# Patient Record
Sex: FEMALE | Race: BLACK OR AFRICAN AMERICAN | Employment: OTHER | URBAN - METROPOLITAN AREA
[De-identification: names, ages, dates, MRNs, and addresses within clinical notes are randomized per-mention and may not be internally consistent; named-entity substitution may affect disease eponyms.]

---

## 2017-01-01 ENCOUNTER — OFFICE VISIT (OUTPATIENT)
Dept: SURGERY | Age: 77
End: 2017-01-01

## 2017-01-01 ENCOUNTER — OFFICE VISIT (OUTPATIENT)
Dept: ORTHOPEDIC SURGERY | Age: 77
End: 2017-01-01

## 2017-01-01 ENCOUNTER — HOSPITAL ENCOUNTER (OUTPATIENT)
Age: 77
Discharge: HOME | End: 2017-06-03
Attending: PSYCHIATRY & NEUROLOGY
Payer: MEDICARE

## 2017-01-01 ENCOUNTER — CLINICAL SUPPORT (OUTPATIENT)
Dept: CARDIOLOGY CLINIC | Age: 77
End: 2017-01-01

## 2017-01-01 ENCOUNTER — HOSPITAL ENCOUNTER (OUTPATIENT)
Dept: MAMMOGRAPHY | Age: 77
Discharge: HOME | End: 2017-03-09
Attending: INTERNAL MEDICINE
Payer: MEDICARE

## 2017-01-01 ENCOUNTER — OFFICE VISIT (OUTPATIENT)
Dept: PULMONOLOGY | Age: 77
End: 2017-01-01

## 2017-01-01 ENCOUNTER — TELEPHONE (OUTPATIENT)
Dept: CARDIOLOGY CLINIC | Age: 77
End: 2017-01-01

## 2017-01-01 ENCOUNTER — OFFICE VISIT (OUTPATIENT)
Dept: CARDIOLOGY CLINIC | Age: 77
End: 2017-01-01

## 2017-01-01 VITALS
DIASTOLIC BLOOD PRESSURE: 82 MMHG | WEIGHT: 133 LBS | SYSTOLIC BLOOD PRESSURE: 128 MMHG | TEMPERATURE: 98.6 F | RESPIRATION RATE: 16 BRPM | BODY MASS INDEX: 22.71 KG/M2 | OXYGEN SATURATION: 96 % | HEIGHT: 64 IN | HEART RATE: 67 BPM

## 2017-01-01 VITALS
SYSTOLIC BLOOD PRESSURE: 132 MMHG | DIASTOLIC BLOOD PRESSURE: 78 MMHG | HEIGHT: 64 IN | OXYGEN SATURATION: 98 % | HEART RATE: 88 BPM | BODY MASS INDEX: 23.05 KG/M2 | WEIGHT: 135 LBS | RESPIRATION RATE: 14 BRPM | TEMPERATURE: 98 F

## 2017-01-01 VITALS
SYSTOLIC BLOOD PRESSURE: 137 MMHG | HEART RATE: 87 BPM | WEIGHT: 133 LBS | HEIGHT: 64 IN | RESPIRATION RATE: 18 BRPM | BODY MASS INDEX: 22.71 KG/M2 | DIASTOLIC BLOOD PRESSURE: 72 MMHG | TEMPERATURE: 98 F | OXYGEN SATURATION: 97 %

## 2017-01-01 VITALS
RESPIRATION RATE: 20 BRPM | HEART RATE: 88 BPM | BODY MASS INDEX: 23.05 KG/M2 | DIASTOLIC BLOOD PRESSURE: 60 MMHG | SYSTOLIC BLOOD PRESSURE: 124 MMHG | WEIGHT: 135 LBS | HEIGHT: 64 IN | TEMPERATURE: 98 F

## 2017-01-01 VITALS
HEART RATE: 80 BPM | SYSTOLIC BLOOD PRESSURE: 117 MMHG | WEIGHT: 136 LBS | HEIGHT: 64 IN | DIASTOLIC BLOOD PRESSURE: 60 MMHG | BODY MASS INDEX: 23.22 KG/M2

## 2017-01-01 DIAGNOSIS — I08.0 MITRAL VALVE INSUFFICIENCY AND AORTIC VALVE INSUFFICIENCY: ICD-10-CM

## 2017-01-01 DIAGNOSIS — N18.6 ESRD (END STAGE RENAL DISEASE) ON DIALYSIS (HCC): ICD-10-CM

## 2017-01-01 DIAGNOSIS — I42.9 CARDIOMYOPATHY, UNSPECIFIED TYPE (HCC): ICD-10-CM

## 2017-01-01 DIAGNOSIS — Z99.2 ESRD (END STAGE RENAL DISEASE) ON DIALYSIS (HCC): ICD-10-CM

## 2017-01-01 DIAGNOSIS — G62.9 NEUROPATHY: ICD-10-CM

## 2017-01-01 DIAGNOSIS — M48.02 CERVICAL SPINAL STENOSIS: ICD-10-CM

## 2017-01-01 DIAGNOSIS — L29.0 PRURITUS ANI: Primary | ICD-10-CM

## 2017-01-01 DIAGNOSIS — Z12.31 VISIT FOR SCREENING MAMMOGRAM: ICD-10-CM

## 2017-01-01 DIAGNOSIS — I69.993 CVA, OLD, ATAXIA: ICD-10-CM

## 2017-01-01 DIAGNOSIS — I50.32 DIASTOLIC CHF, CHRONIC (HCC): ICD-10-CM

## 2017-01-01 DIAGNOSIS — I42.9 CARDIOMYOPATHY (HCC): ICD-10-CM

## 2017-01-01 DIAGNOSIS — L29.0 ANAL ITCHING: Primary | ICD-10-CM

## 2017-01-01 DIAGNOSIS — J44.9 COPD, MODERATE (HCC): Primary | ICD-10-CM

## 2017-01-01 DIAGNOSIS — R41.0 CONFUSION: ICD-10-CM

## 2017-01-01 DIAGNOSIS — Z86.73 HISTORY OF CVA (CEREBROVASCULAR ACCIDENT): ICD-10-CM

## 2017-01-01 DIAGNOSIS — M48.061 LUMBAR STENOSIS: Primary | Chronic | ICD-10-CM

## 2017-01-01 DIAGNOSIS — I10 ESSENTIAL HYPERTENSION, BENIGN: ICD-10-CM

## 2017-01-01 DIAGNOSIS — I82.432 ACUTE DEEP VEIN THROMBOSIS (DVT) OF POPLITEAL VEIN OF LEFT LOWER EXTREMITY (HCC): ICD-10-CM

## 2017-01-01 DIAGNOSIS — J30.1 ALLERGIC RHINITIS DUE TO POLLEN, UNSPECIFIED RHINITIS SEASONALITY: ICD-10-CM

## 2017-01-01 DIAGNOSIS — R15.9 INCONTINENCE OF FECES: ICD-10-CM

## 2017-01-01 DIAGNOSIS — I50.42 CHRONIC COMBINED SYSTOLIC AND DIASTOLIC CHF, NYHA CLASS 3 (HCC): Primary | ICD-10-CM

## 2017-01-01 LAB
BUN SERPL-MCNC: 49 MG/DL (ref 8–27)
BUN/CREAT SERPL: 20 (ref 12–28)
CALCIUM SERPL-MCNC: 9.1 MG/DL (ref 8.7–10.3)
CHLORIDE SERPL-SCNC: 101 MMOL/L (ref 96–106)
CO2 SERPL-SCNC: 21 MMOL/L (ref 18–29)
CREAT SERPL-MCNC: 2.48 MG/DL (ref 0.57–1)
GLUCOSE SERPL-MCNC: 66 MG/DL (ref 65–99)
POTASSIUM SERPL-SCNC: 4.5 MMOL/L (ref 3.5–5.2)
SODIUM SERPL-SCNC: 142 MMOL/L (ref 134–144)

## 2017-01-01 PROCEDURE — 70551 MRI BRAIN STEM W/O DYE: CPT

## 2017-01-01 PROCEDURE — 77067 SCR MAMMO BI INCL CAD: CPT

## 2017-01-01 RX ORDER — LISINOPRIL 10 MG/1
10 TABLET ORAL DAILY
Qty: 30 TAB | Refills: 6 | Status: SHIPPED | OUTPATIENT
Start: 2017-01-01

## 2017-01-01 RX ORDER — GABAPENTIN 600 MG/1
TABLET ORAL
Qty: 45 TAB | Refills: 0 | OUTPATIENT
Start: 2017-01-01

## 2017-01-01 RX ORDER — GABAPENTIN 600 MG/1
TABLET ORAL
Qty: 45 TAB | Refills: 0 | Status: SHIPPED | OUTPATIENT
Start: 2017-01-01

## 2017-01-01 RX ORDER — GABAPENTIN 600 MG/1
TABLET ORAL
Qty: 45 TAB | Refills: 5 | Status: SHIPPED | OUTPATIENT
Start: 2017-01-01 | End: 2017-01-01 | Stop reason: SDUPTHER

## 2017-01-12 NOTE — PROGRESS NOTES
Subjective: Patient is seen here in follow-up for perianal burning. This is persisted. She has several bowel movements per day mostly liquid. She feels some seepage after this and cleans herself off again. She wears a pad daily. She does occasionally see some soilage. She uses wipes frequently and cleaned very thoroughly in the shower. Past medical history and ROS were reviewed and unchanged. Abdomen soft, nontender nondistended  Digital rectal exam shows good tone no mass no rectocele  No anal fissure or external hemorrhoids       Assessment / Plan    Pruritus ani  Reviewed perianal hygiene    Fecal incontinence  Start fiber therapy with Imodium 1-2 times per day as patient is not tolerating Questran    Follow-up in 3 weeks    A total of 15 minutes was spent with the patient, with >50% of time spent on counseling and coordination of care. The diagnoses and plan were discussed with patient. All questions answered. Plan of care agreed to by all concerned.

## 2017-01-18 NOTE — PROGRESS NOTES
[address]  [address], [address]  [facility], [address]  Phone: [phone]  Fax: [phone]        Hui Almonte  : [id number]  PCP: Lul Schmidt MD    PROGRESS NOTE      ASSESSMENT Sonal Montesinos was seen today for neck pain and shoulder pain. Diagnoses and all orders for this visit:    Lumbar stenosis    Cervical spinal stenosis    Neuropathy    Other orders  -     gabapentin (NEURONTIN) 600 mg tablet; Take 1/2 tab by mouth in the morning, Take 1 whole tab by mouth at bedtime. 1. Increase Gabapentin w/caution. . To take 300 mg QAM and continue 600mg QHS. 2. Advised to stay active as tolerated. 3. Given care instructions for stenosis. Follow-up Disposition:  Return in about 6 months (around 2017) for routine med f/u. HISTORY OF PRESENT ILLNESS  Junior Toledo is a 68 y.o. female. Pt presents to the office for a f/u visit for lumbar stenosis. Pt was last evaluated in 2016. Pt presents to the office today with recurring back pain. Pt denies any specific incident or injury that caused their pain. Pt reports that she was in the ED in 2016 as she believed that she had a stroke but she had some kind of UTI. She experienced weakness and confusion and was sent to rehab to increase her strength. She reports a short standing tolerance. Pt reports diabetic neuropathy and a poor balance. She denies any radiating pain into her BLE or BUE in the office today. She denies any saddle paresthesia. Pt notes that she was unable to take Baclofen due to nausea. Pt notes that her insurance would not fill Robaxin. Pt takes Gabapentin 600 mg QHS PRN. Pt does not take any OTC medication as she is unable to find Tylenol capsules in stores as these are the only ones that she can tolerate. Denies persistent fevers, chills, weight changes, neurogenic bowel or bladder symptoms.        Pain Assessment  2017   Location of Pain Back Severity of Pain 5   Quality of Pain Aching   Frequency of Pain Constant   Aggravating Factors Bending;Standing   Limiting Behavior -   Relieving Factors Heat   Result of Injury No       PAST MEDICAL HISTORY   Past Medical History   Diagnosis Date    Anemia NEC     Balance problems     Borderline diabetic     Chronic kidney disease      per pt stage 4    Chronic lung disease     Chronic rhinosinusitis     Congestive heart failure, unspecified     COPD (chronic obstructive pulmonary disease) (HCC)     Cough     Diabetes (HCC)     Diabetes mellitus ([address]     D[facility] thrombosis) ([facility])     Easy bruising     Emphysema     Follow-up exam, neck and knee pain 10/27/2010    Generalized headaches     GERD (gastroesophageal reflux disease)     Hypercholesterolemia     Hypertension     Hypothyroidism     Right knee pain 08-    Shoulder pain, bilateral 08/24/2010    Spinal stenosis     Systemic lupus erythematosus (HCC)     Weight loss        Past Surgical History   Procedure Laterality Date    Hx cataract removal      Hx hysterectomy      Hx renal biopsy      Hx gi       bowel obstruction small    Colonoscopy N/A 9/15/2016     COLONOSCOPY w/ POLYPECTOMY performed by Praveena Miguel MD at [address] [place] [address] Hx colonoscopy       2016   . MEDICATIONS      Current Outpatient Prescriptions   Medication Sig Dispense Refill    gabapentin (NEURONTIN) 600 mg tablet Take 1/2 tab by mouth in the morning, Take 1 whole tab by mouth at bedtime. 45 Tab 5    colestipol (COLESTID) 1 gram tablet Take 1 g by mouth two (2) times a day.  furosemide (LASIX) 40 mg tablet Take 1 Tab by mouth daily as needed. Indications: EDEMA 30 Tab 3    montelukast (SINGULAIR) 10 mg tablet Take 1 Tab by mouth daily.  umeclidinium-vilanterol (ANORO ELLIPTA) 62.5-25 mcg/actuation inhaler Take 1 Puff by inhalation daily. 1 Inhaler 0    ranitidine (ZANTAC) 300 mg tablet Take 300 mg by mouth daily.  levothyroxine (SYNTHROID) 50 mcg tablet       albuterol (PROAIR HFA) 90 mcg/actuation inhaler Take 2 Puffs by inhalation every four (4) hours as needed for Wheezing or Shortness of Breath. 3 Inhaler 3    potassium chloride (K-DUR, KLOR-CON) 20 mEq tablet       fluticasone (FLONASE) 50 mcg/actuation nasal spray nightly.  b complex-vitamin c-folic acid 0.8 mg (NEPHRO-GIGI) 0.8 mg tab tablet Take 1 Tab by mouth daily.  atorvastatin (LIPITOR) 10 mg tablet Take  by mouth daily.  predniSONE (DELTASONE) 5 mg tablet Take 5 mg by mouth daily.  glimepiride (AMARYL) 1 mg tablet Take  by mouth Daily (before breakfast).  carvedilol (COREG) 25 mg tablet Take 25 mg by mouth two (2) times daily (with meals).  allopurinol (ZYLOPRIM) 100 mg tablet Take  by mouth daily. Take 2 tablets daily. ALLERGIES    Allergies   Allergen Reactions    Sulfa (Sulfonamide Antibiotics) Anaphylaxis and Swelling    Baclofen Nausea Only    Codeine Nausea and Vomiting          SOCIAL HISTORY    Social History     Social History    Marital status:      Spouse name: N/A    Number of children: N/A    Years of education: N/A     Occupational History     Retired     Social History Main Topics    Smoking status: Former Smoker     Packs/day: 1.00     Years: 42.00     Types: Cigarettes     Quit date: 12/5/1992    Smokeless tobacco: Never Used    Alcohol use No    Drug use: No    Sexual activity: Not on file     Other Topics Concern    Claustrophobic Yes     Social History Narrative     Social History Narrative      Problem Relation Age of Onset    Cancer Mother     Hypertension Mother     Breast Cancer Mother 72    Cancer Brother      throat    Diabetes Other      aunt - nos    Heart Attack Neg Hx     Sudden Death Neg Hx        REVIEW OF SYSTEMS  Review of Systems   Constitutional: Negative for chills, fever and weight loss. Respiratory: Negative for shortness of breath. Cardiovascular: Negative for chest pain. Gastrointestinal: Negative for constipation. Negative for fecal incontinence   Genitourinary: Negative for dysuria. Negative for urinary incontinence   Musculoskeletal:        Per HPI   Skin: Negative for rash. Neurological: Positive for tingling. Negative for dizziness, tremors, focal weakness and headaches. Endo/Heme/Allergies: Does not bruise/bleed easily. Psychiatric/Behavioral: The patient does not have insomnia. PHYSICAL EXAMINATION  Visit Vitals    /72    Pulse 87    Temp 98 °F (36.7 °C) (Oral)    Resp 18    Ht 5' 4\" (1.626 m)    Wt 133 lb (60.3 kg)    SpO2 97%    BMI 22.83 kg/m2         Accompanied by self. Constitutional:  Well developed, well nourished, in no acute distress. Psychiatric: Affect and mood are appropriate. Integumentary: No rashes or abrasions noted on exposed areas. Cardiovascular/Peripheral Vascular: Intact l pulses. No peripheral edema is noted. Lymphatic:  No evidence of lymphedema. No cervical lymphadenopathy. SPINE/MUSCULOSKELETAL EXAM      Lumbar spine:  No rash, ecchymosis, or gross obliquity. No fasciculations. No focal atrophy is noted. No tenderness to palpation at the sciatic notch. SI joints non-tender. Trochanters non tender. MOTOR:       Hip Flex  Quads Hamstrings Ankle DF EHL Ankle PF   Right +4/5 +4/5 +4/5 +4/5 +4/5 +4/5   Left +4/5 +4/5 +4/5 +4/5 +4/5 +4/5       DTRs are 1+ biceps, triceps, brachioradialis, patella, and Achilles. Straight Leg raise negative. Unable to do tandem gait. Ambulation with single point cane. FWB. Written by Rakesh Rosario, as dictated by Kacie White MD.    Ms. Nikhil Benítez may have a reminder for a \"due or due soon\" health maintenance. I have asked that she contact her primary care provider for follow-up on this health maintenance.

## 2017-01-18 NOTE — PATIENT INSTRUCTIONS
Lumbar Stenosis: Care Instructions  Your Care Instructions    Stenosis in the spine is a narrowing of the canal that is around the spinal cord and nerve roots in your back. It can happen as part of aging. Sometimes bone and other tissue grow into this canal and press on the spinal nerves. This can cause pain, numbness, and weakness. When it happens in the lower part of your back, it is called lumbar stenosis. Lumbar stenosis can cause problems in the legs, feet, and rear end (buttocks). You may be able to relieve symptoms of lumbar stenosis with pain medicine. Your doctor may suggest physical therapy and exercises to keep your spine strong and flexible. Some people try cortisone shots to reduce swelling. If pain and numbness in your legs are still so bad that you cannot do your normal activities, you may need surgery. Follow-up care is a key part of your treatment and safety. Be sure to make and go to all appointments, and call your doctor if you are having problems. It's also a good idea to know your test results and keep a list of the medicines you take. How can you care for yourself at home? · Take an over-the-counter pain medicine, such as acetaminophen (Tylenol), ibuprofen (Advil, Motrin), or naproxen (Aleve). Read and follow all instructions on the label. · Do not take two or more pain medicines at the same time unless the doctor told you to. Many pain medicines have acetaminophen, which is Tylenol. Too much acetaminophen (Tylenol) can be harmful. · Stay at a healthy weight. Being overweight puts extra strain on your spine. · Change positions often when you sit or stand. This can ease pain. For example, lean forward. This may reduce pressure on the spinal cord and its nerves. · Avoid doing things that make your symptoms worse. Walking downhill and standing for a long time may cause pain. · Stretch and strengthen your back muscles as your doctor or physical therapist recommends.  If your doctor says it is okay to do them, these exercises may help. ¨ Lie on your back with your knees bent. Gently pull one bent knee to your chest. Put that foot back on the floor, and then pull the other knee to your chest.  ¨ Do pelvic tilts. Lie on your back with your knees bent. Tighten your stomach muscles. Pull your belly button (navel) in and up toward your ribs. You should feel like your back is pressing to the floor and your hips and pelvis are slightly lifting off the floor. Hold for 6 seconds while breathing smoothly. ¨ Stand with your back flat against a wall. Slowly slide down until your knees are slightly bent. Hold for 10 seconds, then slide back up the wall. · Remove or change anything in your house that may cause you to fall. Keep walkways clear of clutter, electrical cords, and throw rugs. When should you call for help? Call 911 anytime you think you may need emergency care. For example, call if:  · You are unable to move a leg at all. Call your doctor now or seek immediate medical care if:  · You have new or worse symptoms in your legs, belly, or buttocks. Symptoms may include:  ¨ Numbness or tingling. ¨ Weakness. ¨ Pain. · You lose bladder or bowel control. Watch closely for changes in your health, and be sure to contact your doctor if:  · You are not getting better as expected. Where can you learn more? Go to http://serge-lucas.info/. Lavon May in the search box to learn more about \"Lumbar Stenosis: Care Instructions. \"  Current as of: May 23, 2016  Content Version: 11.1  © 9614-8330 [employer]. Care instructions adapted under license by [employer] (which disclaims liability or warranty for this information). If you have questions about a medical condition or this instruction, always ask your healthcare professional. [address] any warranty or liability for your use of this information.

## 2017-01-18 NOTE — MR AVS SNAPSHOT
Visit Information Date & Time Provider Department Dept. Phone Encounter #  
 1/18/2017  2:15 PM Piedad Acosta MD [facility] [phone] [id number] Follow-up Instructions Return in about 6 months (around 7/18/2017) for routine med f/u. Your Appointments 1/19/2017 11:00 AM  
Follow Up with Paul Dove MD  
[facility] ([address]) Appt Note: 1 wk f/up [address] [facility] [address] [address] [address]  
  
    
 3/8/2017 11:45 AM  
ECHO CARDIOGRAMS 2D with [facility] ([address]) Appt Note: echo/briscoe [address] [facility] [address], [address] [facility] [address]  
[address], [address] [address]  
  
    
 4/6/2017  1:30 PM  
Follow Up with Seble Hunter MD  
[facility]) Appt Note: Post echo follow up [address] [facility] [address], [address] [facility] [address]  
[address], [address] [facility] [address] [place] [address] Upcoming Health Maintenance Date Due  
 FOOT EXAM Q1 4/14/1950 EYE EXAM RETINAL OR DILATED Q1 4/14/1950 ZOSTER VACCINE AGE 60> 4/14/2000 GLAUCOMA SCREENING Q2Y 4/14/2005 Pneumococcal 65+ Low/Medium Risk (1 of 2 - PCV13) 4/14/2005 MEDICARE YEARLY EXAM 4/14/2005 MICROALBUMIN Q1 8/9/2011 INFLUENZA AGE 9 TO ADULT 8/1/2016 HEMOGLOBIN A1C Q6M 8/29/2016 LIPID PANEL Q1 2/28/2017 DTaP/Tdap/Td series (2 - Td) 8/28/2025 Allergies as of 1/18/2017  Review Complete On: 1/18/2017 By: Piedad Acosta MD  
  
 Severity Noted Reaction Type Reactions Sulfa (Sulfonamide Antibiotics) High 10/27/2010   Not Verified Anaphylaxis, Swelling Baclofen  01/18/2017    Nausea Only Codeine  08/10/2016    Nausea and Vomiting Current Immunizations  Reviewed on 12/5/2012 Name Date Influenza Vaccine Split 12/5/2012 Tdap 8/28/2015  2:04 AM  
  
 Not reviewed this visit You Were Diagnosed With   
  
 Codes Comments Lumbar stenosis    -  Primary ICD-10-CM: M48.06 
ICD-9-CM: 724.02 Vitals BP Pulse Temp Resp Height(growth percentile) Weight(growth percentile)  
 137/72 87 98 °F (36.7 °C) (Oral) 18 5' 4\" (1.626 m) 133 lb (60.3 kg) SpO2 BMI OB Status Smoking Status 97% 22.83 kg/m2 Hysterectomy Former Smoker BMI and BSA Data Body Mass Index Body Surface Area  
 22.83 kg/m 2 1.65 m 2 Preferred Pharmacy Pharmacy Name Phone [address], Cora Flores [address] [place], [place] [id number] [facility] [phone] Your Updated Medication List  
  
   
This list is accurate as of: 1/18/17  3:21 PM.  Always use your most recent med list.  
  
  
  
  
 albuterol 90 mcg/actuation inhaler Commonly known as:  PROAIR HFA Take 2 Puffs by inhalation every four (4) hours as needed for Wheezing or Shortness of Breath. allopurinol 100 mg tablet Commonly known as:  Katheen Deagunjan Take  by mouth daily. Take 2 tablets daily. atorvastatin 10 mg tablet Commonly known as:  LIPITOR Take  by mouth daily. b complex-vitamin c-folic acid 0.8 mg 0.8 mg Tab tablet Commonly known as:  Daune French Take 1 Tab by mouth daily. carvedilol 25 mg tablet Commonly known as:  Sophia Charlotte Take 25 mg by mouth two (2) times daily (with meals). COLESTID 1 gram tablet Generic drug:  colestipol Take 1 g by mouth two (2) times a day. FLONASE 50 mcg/actuation nasal spray Generic drug:  fluticasone  
nightly. furosemide 40 mg tablet Commonly known as:  LASIX Take 1 Tab by mouth daily as needed. Indications: EDEMA  
  
 gabapentin 600 mg tablet Commonly known as:  NEURONTIN  
TAKE 1 TABLET BY MOUTH EVERY EVENING  
  
 glimepiride 1 mg tablet Commonly known as:  AMARYL Take  by mouth Daily (before breakfast). levothyroxine 50 mcg tablet Commonly known as:  SYNTHROID  
  
 montelukast 10 mg tablet Commonly known as:  SINGULAIR Take 1 Tab by mouth daily. potassium chloride 20 mEq tablet Commonly known as:  K-DUR, KLOR-CON  
  
 predniSONE 5 mg tablet Commonly known as:  Marcelene Im Take 5 mg by mouth daily. raNITIdine 300 mg tablet Commonly known as:  ZANTAC Take 300 mg by mouth daily. umeclidinium-vilanterol 62.5-25 mcg/actuation inhaler Commonly known as:  Tonja Dills Take 1 Puff by inhalation daily. Follow-up Instructions Return in about 6 months (around 7/18/2017) for routine med f/u. Patient Instructions Lumbar Stenosis: Care Instructions Your Care Instructions Stenosis in the spine is a narrowing of the canal that is around the spinal cord and nerve roots in your back. It can happen as part of aging. Sometimes bone and other tissue grow into this canal and press on the spinal nerves. This can cause pain, numbness, and weakness. When it happens in the lower part of your back, it is called lumbar stenosis. Lumbar stenosis can cause problems in the legs, feet, and rear end (buttocks). You may be able to relieve symptoms of lumbar stenosis with pain medicine. Your doctor may suggest physical therapy and exercises to keep your spine strong and flexible. Some people try cortisone shots to reduce swelling. If pain and numbness in your legs are still so bad that you cannot do your normal activities, you may need surgery. Follow-up care is a key part of your treatment and safety. Be sure to make and go to all appointments, and call your doctor if you are having problems. It's also a good idea to know your test results and keep a list of the medicines you take. How can you care for yourself at home?  
· Take an over-the-counter pain medicine, such as acetaminophen (Tylenol), ibuprofen (Advil, Motrin), or naproxen (Aleve). Read and follow all instructions on the label. · Do not take two or more pain medicines at the same time unless the doctor told you to. Many pain medicines have acetaminophen, which is Tylenol. Too much acetaminophen (Tylenol) can be harmful. · Stay at a healthy weight. Being overweight puts extra strain on your spine. · Change positions often when you sit or stand. This can ease pain. For example, lean forward. This may reduce pressure on the spinal cord and its nerves. · Avoid doing things that make your symptoms worse. Walking downhill and standing for a long time may cause pain. · Stretch and strengthen your back muscles as your doctor or physical therapist recommends. If your doctor says it is okay to do them, these exercises may help. ¨ Lie on your back with your knees bent. Gently pull one bent knee to your chest. Put that foot back on the floor, and then pull the other knee to your chest. 
¨ Do pelvic tilts. Lie on your back with your knees bent. Tighten your stomach muscles. Pull your belly button (navel) in and up toward your ribs. You should feel like your back is pressing to the floor and your hips and pelvis are slightly lifting off the floor. Hold for 6 seconds while breathing smoothly. ¨ Stand with your back flat against a wall. Slowly slide down until your knees are slightly bent. Hold for 10 seconds, then slide back up the wall. · Remove or change anything in your house that may cause you to fall. Keep walkways clear of clutter, electrical cords, and throw rugs. When should you call for help? Call 911 anytime you think you may need emergency care. For example, call if: 
· You are unable to move a leg at all. Call your doctor now or seek immediate medical care if: 
· You have new or worse symptoms in your legs, belly, or buttocks. Symptoms may include: ¨ Numbness or tingling. ¨ Weakness. ¨ Pain. · You lose bladder or bowel control. Watch closely for changes in your health, and be sure to contact your doctor if: 
· You are not getting better as expected. Where can you learn more? Go to http://serge-lucas.info/. Rafael Michael in the search box to learn more about \"Lumbar Stenosis: Care Instructions. \" Current as of: May 23, 2016 Content Version: 11.1 © 9014-1546 [employer]. Care instructions adapted under license by [employer] (which disclaims liability or warranty for this information). If you have questions about a medical condition or this instruction, always ask your healthcare professional. [place] [address] any warranty or liability for your use of this information. Introducing [facility]! Dear hospitals: 
Thank you for requesting a [employer] account. Our records indicate that you already have an active [employer] account. You can access your account anytime at [email or web address] Did you know that you can access your hospital and ER discharge instructions at any time in [employer]? You can also review all of your test results from your hospital stay or ER visit. Additional Information If you have questions, please visit the Frequently Asked Questions section of the [employer] website at [email or web address] Remember, [employer] is NOT to be used for urgent needs. For medical emergencies, dial 911. Now available from your iPhone and Android! Please provide this summary of care documentation to your next provider. Your primary care clinician is listed as MARIA ISABEL COLEMAN. If you have any questions after today's visit, please call [phone].

## 2017-01-19 NOTE — PROGRESS NOTES
Subjective: Patient states that her symptoms of burning and itching have resolved. She also tells me that she has not been having any leakage since starting the fiber and Imodium. She has  adhering to the perianal hygiene concepts we discussed. Past medical history and ROS were reviewed and unchanged. Abdomen soft, nontender nondistended  Digital rectal exam: Good tone, no mass  Perianal skin very healthy in appearance    Assessment / Plan    Perianal dermatitis resolved  Continue fiber therapy and Imodium for leakage  I told her she can try 1 Imodium per day if she wishes to decrease her dose  Follow-up next fall for surveillance colonoscopy    A total of 15 minutes was spent with the patient, with >50% of time spent on counseling and coordination of care. The diagnoses and plan were discussed with patient. All questions answered. Plan of care agreed to by all concerned.

## 2017-04-03 NOTE — MR AVS SNAPSHOT
Visit Information Date & Time Provider Department Dept. Phone Encounter #  
 4/3/2017  3:30 PM Girish Reilly MD [facility] [id number] Follow-up Instructions Return in about 6 months (around 10/3/2017). Your Appointments 4/6/2017 12:30 PM  
ESTABLISHED PATIENT with Britta Silveira MD  
[facility]) Appt Note: Post echo follow up; Post echo follow up [address] [facility], [address] [facility] [address]  
[address], [address] [facility] [address]  
  
    
 7/3/2017 11:00 AM  
Follow Up with 127 Sharif Acosta MD  
[facility] ONE [address]) Appt Note: 6 month back f/u  
 [address] [facility] [address]  
[phone]  
  
   
 [address] [facility] [address] Upcoming Health Maintenance Date Due  
 FOOT EXAM Q1 4/14/1950 EYE EXAM RETINAL OR DILATED Q1 4/14/1950 ZOSTER VACCINE AGE 60> 4/14/2000 GLAUCOMA SCREENING Q2Y 4/14/2005 Pneumococcal 65+ Low/Medium Risk (1 of 2 - PCV13) 4/14/2005 MEDICARE YEARLY EXAM 4/14/2005 MICROALBUMIN Q1 8/9/2011 INFLUENZA AGE 9 TO ADULT 8/1/2016 HEMOGLOBIN A1C Q6M 8/29/2016 LIPID PANEL Q1 2/28/2017 DTaP/Tdap/Td series (2 - Td) 8/28/2025 Allergies as of 4/3/2017  Review Complete On: 4/3/2017 By: Girish Reilly MD  
  
 Severity Noted Reaction Type Reactions Sulfa (Sulfonamide Antibiotics) High 10/27/2010   Not Verified Anaphylaxis, Swelling Baclofen  01/18/2017    Nausea Only Codeine  08/10/2016    Nausea and Vomiting Current Immunizations  Reviewed on 12/5/2012 Name Date Influenza Vaccine Split 12/5/2012 Tdap 8/28/2015  2:04 AM  
  
 Not reviewed this visit Vitals BP Pulse Temp Resp Height(growth percentile) Weight(growth percentile) 132/78 (BP 1 Location: Left arm, BP Patient Position: Sitting) 88 98 °F (36.7 °C) (Oral) 14 5' 4\" (1.626 m) 135 lb (61.2 kg) SpO2 BMI OB Status Smoking Status 98% 23.17 kg/m2 Hysterectomy Former Smoker BMI and BSA Data Body Mass Index Body Surface Area  
 23.17 kg/m 2 1.66 m 2 Preferred Pharmacy Pharmacy Name Phone [address], [address] [place] [address] [phone] [facility] [phone] Your Updated Medication List  
  
   
This list is accurate as of: 4/3/17  4:23 PM.  Always use your most recent med list.  
  
  
  
  
 albuterol 90 mcg/actuation inhaler Commonly known as:  PROAIR HFA Take 2 Puffs by inhalation every four (4) hours as needed for Wheezing or Shortness of Breath. allopurinol 100 mg tablet Commonly known as:  Onnie Lakeisha Take  by mouth daily. Take 2 tablets daily. atorvastatin 10 mg tablet Commonly known as:  LIPITOR Take  by mouth daily. b complex-vitamin c-folic acid 0.8 mg 0.8 mg Tab tablet Commonly known as:  Zenaida Flies Take 1 Tab by mouth daily. carvedilol 25 mg tablet Commonly known as:  Evie Van Take 25 mg by mouth two (2) times daily (with meals). COLESTID 1 gram tablet Generic drug:  colestipol Take 1 g by mouth two (2) times a day. FLONASE 50 mcg/actuation nasal spray Generic drug:  fluticasone  
nightly. furosemide 40 mg tablet Commonly known as:  LASIX Take 1 Tab by mouth daily as needed. Indications: EDEMA  
  
 gabapentin 600 mg tablet Commonly known as:  NEURONTIN Take 1/2 tab by mouth in the morning, Take 1 whole tab by mouth at bedtime. glimepiride 1 mg tablet Commonly known as:  AMARYL Take  by mouth Daily (before breakfast). levothyroxine 50 mcg tablet Commonly known as:  SYNTHROID  
  
 montelukast 10 mg tablet Commonly known as:  SINGULAIR Take 1 Tab by mouth daily. potassium chloride 20 mEq tablet Commonly known as:  RAJESH-JAYMIE CASTROOR-JESSICA  
  
 predniSONE 5 mg tablet Commonly known as:  Earllouis Reveal Take 5 mg by mouth daily. raNITIdine 300 mg tablet Commonly known as:  ZANTAC Take 300 mg by mouth daily. umeclidinium-vilanterol 62.5-25 mcg/actuation inhaler Commonly known as:  Scott Erickson Take 1 Puff by inhalation daily. Follow-up Instructions Return in about 6 months (around 10/3/2017). Introducing [facility]! Dear Kelton Clay: 
Thank you for requesting a FileThis account. Our records indicate that you already have an active FileThis account. You can access your account anytime at [email or web address] Did you know that you can access your hospital and ER discharge instructions at any time in FileThis? You can also review all of your test results from your hospital stay or ER visit. Additional Information If you have questions, please visit the Frequently Asked Questions section of the FileThis website at [email or web address] Remember, FileThis is NOT to be used for urgent needs. For medical emergencies, dial 911. Now available from your iPhone and Android! Please provide this summary of care documentation to your next provider. Your primary care clinician is listed as MARIA ISABEL COLEMAN. If you have any questions after today's visit, please call [phone].

## 2017-04-03 NOTE — PROGRESS NOTES
HISTORY OF PRESENT ILLNESS  Elsa Morales is a 68 y.o. female. HPI Comments: Pt with moderate COPD by GOLD criteria, reports significant improvement in baseline dyspnea and exercise tolerance since starting Anoro and completion of Pulmonary Rehab. Reports improved exercise tolerance and has used rescue inhaler rarely since last visit. No new respiratory symtpoms registered. Uses rescue Albuterol once per week. COPD   The history is provided by the patient. This is a chronic problem. The current episode started more than 1 week ago. The problem occurs every several days. The problem has been rapidly improving. Associated symptoms include shortness of breath. Pertinent negatives include no chest pain, no abdominal pain and no headaches. Review of Systems   Constitutional: Negative for chills, diaphoresis, fever, malaise/fatigue and weight loss. HENT: Negative for congestion, ear discharge, ear pain, hearing loss, nosebleeds, sore throat and tinnitus. Eyes: Negative for blurred vision, double vision, photophobia, pain, discharge and redness. Respiratory: Positive for shortness of breath. Negative for hemoptysis and stridor. Cough: occasional. Sputum production: rare. Wheezing: rare     Cardiovascular: Negative for chest pain, palpitations, orthopnea, claudication, leg swelling and PND. Gastrointestinal: Negative for abdominal pain, blood in stool, constipation, diarrhea, heartburn, nausea and vomiting. Genitourinary: Negative for dysuria, frequency, hematuria and urgency. Musculoskeletal: Positive for joint pain. Negative for back pain, falls, myalgias and neck pain. Skin: Negative for itching and rash. Neurological: Negative for dizziness, tingling, tremors, sensory change, speech change, focal weakness, seizures, loss of consciousness, weakness and headaches. Endo/Heme/Allergies: Negative for environmental allergies. Does not bruise/bleed easily.    Psychiatric/Behavioral: Negative for depression, hallucinations, substance abuse and suicidal ideas. The patient is not nervous/anxious and does not have insomnia. Past Medical History:   Diagnosis Date    Anemia NEC     Balance problems     Borderline diabetic     Chronic kidney disease     per pt stage 4    Chronic lung disease     Chronic rhinosinusitis     Congestive heart failure, unspecified     COPD [facility] obstructive [facility] disease) ([facility])     Cough     Diabetes (HCC)     Diabetes mellitus ([facility]     DVT (deep venous thrombosis) ([facility])     Easy bruising     Emphysema     Follow-up exam, neck and knee pain 10/27/2010    Generalized headaches     GERD (gastroesophageal reflux disease)     Hypercholesterolemia     Hypertension     Hypothyroidism     Right knee pain 08-    Shoulder pain, bilateral 08/24/2010    Spinal stenosis     Systemic lupus erythematosus ([facility]     Weight loss      Current Outpatient Prescriptions on File Prior to Visit   Medication Sig Dispense Refill    gabapentin (NEURONTIN) 600 mg tablet Take 1/2 tab by mouth in the morning, Take 1 whole tab by mouth at bedtime. 45 Tab 5    colestipol (COLESTID) 1 gram tablet Take 1 g by mouth two (2) times a day.  furosemide (LASIX) 40 mg tablet Take 1 Tab by mouth daily as needed. Indications: EDEMA 30 Tab 3    montelukast (SINGULAIR) 10 mg tablet Take 1 Tab by mouth daily.  umeclidinium-vilanterol (ANORO ELLIPTA) 62.5-25 mcg/actuation inhaler Take 1 Puff by inhalation daily. 1 Inhaler 0    ranitidine (ZANTAC) 300 mg tablet Take 300 mg by mouth daily.  levothyroxine (SYNTHROID) 50 mcg tablet       albuterol (PROAIR HFA) 90 mcg/actuation inhaler Take 2 Puffs by inhalation every four (4) hours as needed for Wheezing or Shortness of Breath. 3 Inhaler 3    potassium chloride (K-DUR, KLOR-CON) 20 mEq tablet       fluticasone (FLONASE) 50 mcg/actuation nasal spray nightly.       b complex-vitamin c-folic acid 0.8 mg (NEPHRO-GIGI) 0.8 mg tab tablet Take 1 Tab by mouth daily.  atorvastatin (LIPITOR) 10 mg tablet Take  by mouth daily.  predniSONE (DELTASONE) 5 mg tablet Take 5 mg by mouth daily.  glimepiride (AMARYL) 1 mg tablet Take  by mouth Daily (before breakfast).  carvedilol (COREG) 25 mg tablet Take 25 mg by mouth two (2) times daily (with meals).  allopurinol (ZYLOPRIM) 100 mg tablet Take  by mouth daily. Take 2 tablets daily. No current facility-administered medications on file prior to visit. Allergies   Allergen Reactions    Sulfa (Sulfonamide Antibiotics) Anaphylaxis and Swelling    Baclofen Nausea Only    Codeine Nausea and Vomiting     Social History     Social History    Marital status:      Spouse name: N/A    Number of children: N/A    Years of education: N/A     Occupational History     Retired     Social History Main Topics    Smoking status: Former Smoker     Packs/day: 1.00     Years: 42.00     Types: Cigarettes     Quit date: 12/5/1992    Smokeless tobacco: Never Used    Alcohol use No    Drug use: No    Sexual activity: Not on file     Other Topics Concern    Claustrophobic Yes     Social History Narrative     Blood pressure 132/78, pulse 88, temperature 98 °F (36.7 °C), temperature source Oral, resp. rate 14, height 5' 4\" (1.626 m), weight 61.2 kg (135 lb), SpO2 98 %. Physical Exam   Constitutional: She is oriented to person, place, and time. She appears well-developed and well-nourished. No distress. Walks with a cane     HENT:   Head: Normocephalic and atraumatic. Nose: Nose normal.   Mouth/Throat: No oropharyngeal exudate. Eyes: Conjunctivae and EOM are normal. Pupils are equal, round, and reactive to light. Right eye exhibits no discharge. Left eye exhibits no discharge. No scleral icterus. Neck: No JVD present. No tracheal deviation present. No thyromegaly present.    Cardiovascular: Normal rate, regular rhythm, normal heart sounds and intact distal pulses. Exam reveals no gallop. No murmur heard. Pulmonary/Chest: Effort normal. No stridor. No respiratory distress. She has no wheezes. She has no rales. She exhibits no tenderness. Distant breath sounds   Abdominal: Soft. She exhibits no mass. There is no tenderness. Musculoskeletal: She exhibits no edema or tenderness. Lymphadenopathy:     She has no cervical adenopathy. Neurological: She is alert and oriented to person, place, and time. Skin: Skin is warm and dry. No rash noted. She is not diaphoretic. No erythema. Psychiatric: She has a normal mood and affect. Her behavior is normal. Judgment and thought content normal.       Encounter Diagnoses   Name Primary?  COPD, moderate ([address] Yes    Allergic rhinitis due to pollen, unspecified rhinitis seasonality     Diastolic CHF, chronic (HCC)     Cervical spinal stenosis     ESRD (end stage renal disease) on dialysis (HCC)      COPD both symptomatically and spirometrically improved after OPR and Anoro. continue Anoro ND PRN Albuterol. RTC 6 months, flu vaccination then.

## 2017-04-06 PROBLEM — I82.432 ACUTE DEEP VEIN THROMBOSIS (DVT) OF POPLITEAL VEIN OF LEFT LOWER EXTREMITY (HCC): Status: ACTIVE | Noted: 2017-01-01

## 2017-04-06 NOTE — LETTER
Gaudencio Mcgee 1940 4/6/2017 Dear MD Girish Murray MD Phylliss Goodie, MD 
 
I had the pleasure of evaluating  Ms. Luisito Paredes in office today. Below are the relevant portions of my assessment and plan of care. ICD-10-CM ICD-9-CM 1. Chronic combined systolic and diastolic CHF, NYHA class 3 (HCC) I50.42 428.42   
  428.0   
 sob on exertion 
nyha class3 2. Cardiomyopathy, unspecified type I42.9 425.4   
 recently worse lvef 3. Essential hypertension, benign I10 401.1   
 controlled 4. Mitral valve insufficiency and aortic valve insufficiency I08.0 396.3   
 mod to severe mr 
mod ai 
  
 
 
Current Outpatient Prescriptions Medication Sig Dispense Refill  lisinopril (PRINIVIL, ZESTRIL) 10 mg tablet Take 1 Tab by mouth daily. 30 Tab 6  
 gabapentin (NEURONTIN) 600 mg tablet Take 1/2 tab by mouth in the morning, Take 1 whole tab by mouth at bedtime. 45 Tab 5  colestipol (COLESTID) 1 gram tablet Take 1 g by mouth two (2) times a day.  furosemide (LASIX) 40 mg tablet Take 1 Tab by mouth daily as needed. Indications: EDEMA (Patient taking differently: Take 40 mg by mouth daily. Indications: Edema) 30 Tab 3  
 umeclidinium-vilanterol (ANORO ELLIPTA) 62.5-25 mcg/actuation inhaler Take 1 Puff by inhalation daily. 1 Inhaler 0  
 ranitidine (ZANTAC) 300 mg tablet Take 300 mg by mouth daily.  levothyroxine (SYNTHROID) 50 mcg tablet  albuterol (PROAIR HFA) 90 mcg/actuation inhaler Take 2 Puffs by inhalation every four (4) hours as needed for Wheezing or Shortness of Breath. 3 Inhaler 3  potassium chloride (K-DUR, KLOR-CON) 20 mEq tablet 20 mEq daily.  fluticasone (FLONASE) 50 mcg/actuation nasal spray nightly.  b complex-vitamin c-folic acid 0.8 mg (NEPHRO-GIGI) 0.8 mg tab tablet Take 1 Tab by mouth daily.  atorvastatin (LIPITOR) 10 mg tablet Take  by mouth daily.  predniSONE (DELTASONE) 5 mg tablet Take 5 mg by mouth daily.  glimepiride (AMARYL) 1 mg tablet Take  by mouth Daily (before breakfast).  carvedilol (COREG) 25 mg tablet Take 25 mg by mouth two (2) times daily (with meals).  allopurinol (ZYLOPRIM) 100 mg tablet Take  by mouth daily. Take 2 tablets daily.  montelukast (SINGULAIR) 10 mg tablet Take 1 Tab by mouth daily. Orders Placed This Encounter  lisinopril (PRINIVIL, ZESTRIL) 10 mg tablet Sig: Take 1 Tab by mouth daily. Dispense:  30 Tab Refill:  6 If you have questions, please do not hesitate to call me. I look forward to following Ms. Christoph Rai along with you. Sincerely, Sheila White MD

## 2017-04-06 NOTE — PROGRESS NOTES
1. Have you been to the ER, urgent care clinic since your last visit? Hospitalized since your last visit? Alisha 3/17 records in Care everywhere   Swelling     2. Have you seen or consulted any other health care providers outside of the [address] [place] [address] since your last visit? Include any pap smears or colon screening. No     3. Since your last visit, have you had any of the following symptoms? Swelling in legs     4. Have you had any blood work, X-rays or cardiac testing? Yes in care everywhere   Labs /PVL/EKG/ MRI     5. Where do you normally have your labs drawn? [facility]    6. Do you need any refills today?   No        Medications confirmed verbally by patient

## 2017-04-06 NOTE — MR AVS SNAPSHOT
Visit Information Date & Time Provider Department Dept. Phone Encounter #  
 4/6/2017 12:00 PM Sophie Moffett MD [facility] [id number] Follow-up Instructions Return in about 3 months (around 7/6/2017). Your Appointments 4/6/2017 12:00 PM  
ESTABLISHED PATIENT with Sophie Moffett MD  
[facility]) Appt Note: Post echo follow up; Post echo follow up; Post echo follow up [address] [facility], [address] [facility] [address]  
[address], [address] [address]  
  
    
 7/3/2017 11:00 AM  
Follow Up with Piedad Acosta MD  
[facility]) Appt Note: 6 month back f/u  
 [address] [facility] [address]  
[address] [facility] [address]  
  
    
 7/5/2017 10:30 AM  
ESTABLISHED PATIENT with Sophie Moffett MD  
[facility]) Appt Note: 3 months [address], [address] [facility] [address] [phone] Upcoming Health Maintenance Date Due  
 FOOT EXAM Q1 4/14/1950 EYE EXAM RETINAL OR DILATED Q1 4/14/1950 ZOSTER VACCINE AGE 60> 4/14/2000 GLAUCOMA SCREENING Q2Y 4/14/2005 Pneumococcal 65+ High/Highest Risk (1 of 2 - PCV13) 4/14/2005 MEDICARE YEARLY EXAM 4/14/2005 MICROALBUMIN Q1 8/9/2011 INFLUENZA AGE 9 TO ADULT 8/1/2016 HEMOGLOBIN A1C Q6M 8/29/2016 LIPID PANEL Q1 2/28/2017 DTaP/Tdap/Td series (2 - Td) 8/28/2025 Allergies as of 4/6/2017  Review Complete On: 4/6/2017 By: Sophie Moffett MD  
  
 Severity Noted Reaction Type Reactions Sulfa (Sulfonamide Antibiotics) High 10/27/2010   Not Verified Anaphylaxis, Swelling Baclofen  01/18/2017    Nausea Only Codeine  08/10/2016    Nausea and Vomiting Current Immunizations  Reviewed on 12/5/2012 Name Date Influenza Vaccine Split 12/5/2012 Tdap 8/28/2015  2:04 AM  
  
 Not reviewed this visit You Were Diagnosed With   
  
 Codes Comments Chronic combined systolic and diastolic CHF, NYHA class 3 (HCC)    -  Primary ICD-10-CM: I50.42 
ICD-9-CM: 428.42, 428.0 sob on exertion 
nyha class3 Cardiomyopathy, unspecified type     ICD-10-CM: I42.9 ICD-9-CM: 425.4 recently worse lvef Essential hypertension, benign     ICD-10-CM: I10 
ICD-9-CM: 401.1 controlled Mitral valve insufficiency and aortic valve insufficiency     ICD-10-CM: I08.0 ICD-9-CM: 396.3 mod to severe mr 
mod ai Vitals BP Pulse Height(growth percentile) Weight(growth percentile) BMI OB Status 117/60 80 5' 4\" (1.626 m) 136 lb (61.7 kg) 23.34 kg/m2 Hysterectomy Smoking Status Former Smoker Vitals History BMI and BSA Data Body Mass Index Body Surface Area  
 23.34 kg/m 2 1.67 m 2 Preferred Pharmacy Pharmacy Name Phone [address], [address] [place] [address] [phone] [facility] [phone] Your Updated Medication List  
  
   
This list is accurate as of: 4/6/17 11:26 AM.  Always use your most recent med list.  
  
  
  
  
 albuterol 90 mcg/actuation inhaler Commonly known as:  PROAIR HFA Take 2 Puffs by inhalation every four (4) hours as needed for Wheezing or Shortness of Breath. allopurinol 100 mg tablet Commonly known as:  Vickie Peralta Take  by mouth daily. Take 2 tablets daily. atorvastatin 10 mg tablet Commonly known as:  LIPITOR Take  by mouth daily. b complex-vitamin c-folic acid 0.8 mg 0.8 mg Tab tablet Commonly known as:  Neo Low Take 1 Tab by mouth daily. carvedilol 25 mg tablet Commonly known as:  Merry Schimke Take 25 mg by mouth two (2) times daily (with meals). COLESTID 1 gram tablet Generic drug:  colestipol Take 1 g by mouth two (2) times a day. FLONASE 50 mcg/actuation nasal spray Generic drug:  fluticasone  
nightly. furosemide 40 mg tablet Commonly known as:  LASIX Take 1 Tab by mouth daily as needed. Indications: EDEMA  
  
 gabapentin 600 mg tablet Commonly known as:  NEURONTIN Take 1/2 tab by mouth in the morning, Take 1 whole tab by mouth at bedtime. glimepiride 1 mg tablet Commonly known as:  AMARYL Take  by mouth Daily (before breakfast). levothyroxine 50 mcg tablet Commonly known as:  SYNTHROID  
  
 lisinopril 10 mg tablet Commonly known as:  Dorette Tim Take 1 Tab by mouth daily. montelukast 10 mg tablet Commonly known as:  SINGULAIR Take 1 Tab by mouth daily. potassium chloride 20 mEq tablet Commonly known as:  K-DUR, KLOR-CON 20 mEq daily. predniSONE 5 mg tablet Commonly known as:  Milus Mile Take 5 mg by mouth daily. raNITIdine 300 mg tablet Commonly known as:  ZANTAC Take 300 mg by mouth daily. umeclidinium-vilanterol 62.5-25 mcg/actuation inhaler Commonly known as:  Eletha Ira Take 1 Puff by inhalation daily. Prescriptions Sent to Pharmacy Refills  
 lisinopril (PRINIVIL, ZESTRIL) 10 mg tablet 6 Sig: Take 1 Tab by mouth daily. Class: Normal  
 Pharmacy: [address] [place] [address] #: [phone] Route: Oral  
  
Follow-up Instructions Return in about 3 months (around 7/6/2017). Introducing [facility]! Dear Keith Levin: 
Thank you for requesting a Interana account. Our records indicate that you already have an active Interana account. You can access your account anytime at [email or web address] Did you know that you can access your hospital and ER discharge instructions at any time in Interana? You can also review all of your test results from your hospital stay or ER visit. Additional Information If you have questions, please visit the Frequently Asked Questions section of the [employer] Analytics[employer] website at [email or web address] Remember, [employer] is NOT to be used for urgent needs. For medical emergencies, dial 911. Now available from your iPhone and Android! Please provide this summary of care documentation to your next provider. Your primary care clinician is listed as MARIA ISABEL COLEMAN. If you have any questions after today's visit, please call [phone].

## 2017-04-06 NOTE — PROGRESS NOTES
HISTORY OF PRESENT ILLNESS  Mitchell Sheriff is a 68 y.o. female. HPI Comments: 11/16 f/u hosp for orthostasis. Had UTI also. Still feels dizzy and has held coreg and diltiazem for last 2-3 days. Feels weak, loss of appetite. Patient with ai,mr,htn,copd. On follow up patient denies any chest pains, palpitation or other significant symptoms. Patient has sob on exertion,no changes        Hypertension   The history is provided by the patient. This is a chronic problem. The problem occurs constantly. The problem has not changed since onset. Associated symptoms include shortness of breath. Pertinent negatives include no chest pain. Leg Swelling   The history is provided by the patient. This is a chronic problem. The problem occurs constantly. Associated symptoms include shortness of breath. Pertinent negatives include no chest pain. Fatigue   The history is provided by the medical records and patient. This is a new problem. The current episode started more than 1 week ago. Associated symptoms include shortness of breath. Pertinent negatives include no chest pain. Shortness of Breath   The history is provided by the patient. This is a chronic problem. The problem occurs intermittently. The problem has not changed since onset. Associated symptoms include leg swelling. Pertinent negatives include no fever, no cough, no sputum production, no hemoptysis, no wheezing, no PND, no orthopnea, no chest pain, no vomiting, no rash and no claudication. The problem's precipitants include exercise (needs to sit 3-4 times to make her bed). Cardiomyopathy   The history is provided by the patient. This is a chronic problem. The problem occurs constantly. Associated symptoms include shortness of breath. Pertinent negatives include no chest pain. CHF   The history is provided by the patient and medical records. This is a chronic problem. The problem occurs constantly. The problem has been gradually worsening.  Associated symptoms include shortness of breath. Pertinent negatives include no chest pain. Valvular Heart Disease   The history is provided by the patient (mr). This is a chronic problem. The problem occurs constantly. The problem has not changed since onset. Associated symptoms include shortness of breath. Pertinent negatives include no chest pain. Review of Systems   Constitutional: Positive for fatigue. Negative for chills and fever. HENT: Negative for nosebleeds. Eyes: Negative for blurred vision and double vision. Respiratory: Positive for shortness of breath. Negative for cough, hemoptysis, sputum production and wheezing. Cardiovascular: Positive for leg swelling. Negative for chest pain, palpitations, orthopnea, claudication and PND. Gastrointestinal: Negative for heartburn, nausea and vomiting. Musculoskeletal: Negative for myalgias. Skin: Negative for rash. Neurological: Negative for dizziness and weakness. Endo/Heme/Allergies: Does not bruise/bleed easily.      Family History   Problem Relation Age of Onset   [facility] Cancer Mother     Hypertension Mother     Breast Cancer Mother 72    Cancer Brother      throat    Diabetes Other      aunt - nos    Heart Attack Neg Hx     Sudden Death Neg Hx        Past Medical History:   Diagnosis Date    Anemia NEC     Balance problems     Borderline diabetic     Chronic kidney disease     per pt stage 4    Chronic lung disease     Chronic rhinosinusitis     Congestive heart failure, unspecified     [facility]     Cough     Diabetes ([address]     Diabetes mellitus ([address]     DVT (deep venous thrombosis) ([facility]     Easy bruising     Emphysema     Follow-up exam, neck and knee pain 10/27/2010    Generalized headaches     GERD (gastroesophageal reflux disease)     Hypercholesterolemia     Hypertension     Hypothyroidism     Right knee pain 08-    Shoulder pain, bilateral 08/24/2010    Spinal stenosis     [facility]ic lupus erythematosus [facility]     Weight loss        Past Surgical History:   Procedure Laterality Date    COLONOSCOPY N/A 9/15/2016    COLONOSCOPY w/ POLYPECTOMY performed by Jerry Moore MD at [address] HX CATARACT REMOVAL      HX COLONOSCOPY      2016    HX GI      bowel obstruction small    HX HYSTERECTOMY      HX RENAL BIOPSY         Allergies   Allergen Reactions    Sulfa (Sulfonamide Antibiotics) Anaphylaxis and Swelling    Baclofen Nausea Only    Codeine Nausea and Vomiting       Current Outpatient Prescriptions   Medication Sig    lisinopril (PRINIVIL, ZESTRIL) 10 mg tablet Take 1 Tab by mouth daily.  gabapentin (NEURONTIN) 600 mg tablet Take 1/2 tab by mouth in the morning, Take 1 whole tab by mouth at bedtime.  colestipol (COLESTID) 1 gram tablet Take 1 g by mouth two (2) times a day.  furosemide (LASIX) 40 mg tablet Take 1 Tab by mouth daily as needed. Indications: EDEMA (Patient taking differently: Take 40 mg by mouth daily. Indications: Edema)    umeclidinium-vilanterol (ANORO ELLIPTA) 62.5-25 mcg/actuation inhaler Take 1 Puff by inhalation daily.  ranitidine (ZANTAC) 300 mg tablet Take 300 mg by mouth daily.  levothyroxine (SYNTHROID) 50 mcg tablet     albuterol (PROAIR HFA) 90 mcg/actuation inhaler Take 2 Puffs by inhalation every four (4) hours as needed for Wheezing or Shortness of Breath.  potassium chloride (K-DUR, KLOR-CON) 20 mEq tablet 20 mEq daily.  fluticasone (FLONASE) 50 mcg/actuation nasal spray nightly.  b complex-vitamin c-folic acid 0.8 mg (NEPHRO-GIGI) 0.8 mg tab tablet Take 1 Tab by mouth daily.  atorvastatin (LIPITOR) 10 mg tablet Take  by mouth daily.  predniSONE (DELTASONE) 5 mg tablet Take 5 mg by mouth daily.  glimepiride (AMARYL) 1 mg tablet Take  by mouth Daily (before breakfast).  carvedilol (COREG) 25 mg tablet Take 25 mg by mouth two (2) times daily (with meals).       allopurinol (ZYLOPRIM) 100 mg tablet Take  by mouth daily. Take 2 tablets daily.  montelukast (SINGULAIR) 10 mg tablet Take 1 Tab by mouth daily. No current facility-administered medications for this visit. Visit Vitals    /60    Pulse 80    Ht 5' 4\" (1.626 m)    Wt 61.7 kg (136 lb)    BMI 23.34 kg/m2       Physical Exam   Constitutional: She is oriented to person, place, and time. She appears well-developed and well-nourished. HENT:   Head: Normocephalic and atraumatic. Eyes: Conjunctivae are normal.   Neck: Neck supple. No JVD present. No tracheal deviation present. No thyromegaly present. Cardiovascular: Normal rate and regular rhythm. PMI is not displaced. Exam reveals no gallop, no S3 and no decreased pulses. Murmur heard. Early systolic murmur is present with a grade of 2/6  at the upper right sternal border  Pulmonary/Chest: No respiratory distress. She has no wheezes. She has no rales. She exhibits no tenderness. Abdominal: Soft. There is no tenderness. Musculoskeletal: She exhibits edema. Neurological: She is alert and oriented to person, place, and time. Skin: Skin is warm. Psychiatric: She has a normal mood and affect. Ms. Mihaela Smart has a reminder for a \"due or due soon\" health maintenance. I have asked that she contact her primary care provider for follow-up on this health maintenance. CARDIOLOGY STUDIES 9/1/2012 9/1/2010 8/1/2010 3/1/2010 12/1/2006   EKG Result - - 24 hour monitoring - -   Myocardial Perfusion Scan Result probably normal, mild distal ant fixed defect nl scan, EF 55% - - -   Echocardiogram - Complete Result - - - trace/mild MR, mild AR, EF 57%, PAP 43 -   Doppler US Arterial Result - - - - Renal nl scan   ECHO PER STROKE [facility]  Component Name Value Ref Range   EF Echo 40     Result Impression   :   INCREASED LEFT VENTRICULAR CAVITY SIZE WITH MILD POSTERIOR LEFT VENTRICULAR HYPERTROPHY.    DECREASED LEFT VENTRICULAR EJECTION FRACTION OF 40%.   GLOBAL HYPOKINESIS. MILD DIASTOLIC DYSFUNCTION. MILD BIATRIAL DILATATION. MILD THICKENING/CALCIFICATION OF THE ANTERIOR MITRAL VALVE LEAFLET. MILD AORTIC, MITRAL, AND PULMONIC REGURGITATION. DIFFUSE THICKENING (SCLEROSIS) OF THE AORTIC VALVE CUSPS WITHOUT REDUCED EXCURSION. UNABLE TO ASSESS PULMONARY ARTERY PRESSURE BASED ON TRICUSPID REGURGITATION LESS THAN 2 M/S. LATE POSITIVE BUBBLE STUDY NOT SUGGESTIVE OF PFO   OTHER FINDINGS AS NOTED BELOW. NO PREVIOUS REPORT FOR COMPARISON. NUCLEAR IMAGIN2015  Findings:   1. Post-stress imaging in short axis, horizontal and vertical long axis views reveals normal Isotope uptake in all areas. 2. Resting images also showed normal Isotope intake in all areas. 3. Gated images show mild generalized hypokinesis and reduced systolic function. The ejection fraction is calculated at 41%. Diagnosis:   1. Normal perfusion scan. 2. Mild global hypokinesis and reduced systolic function. 3. Correlate clinically. SUMMARY:echo:3/2015  Left ventricle: The ventricle was mildly dilated. Systolic function was  mildly reduced. Ejection fraction was estimated to be 50 %. There was mild  diffuse hypokinesis. Doppler parameters were consistent with abnormal left  ventricular relaxation (grade 1 diastolic dysfunction). Left atrium: The atrium was mildly dilated. Mitral valve: There was mild annular calcification. There was mild diffuse  thickening of the anterior and posterior leaflets. There was moderate  regurgitation with dual jets. The effective orifice of mitral  regurgitation by proximal isovelocity surface area was 0.26 cm squared. The  volume of mitral regurgitation by proximal isovelocity surface area was 45  ml. Aortic valve: The valve was trileaflet. Leaflets exhibited mildly  increased thickness, normal cuspal separation, and sclerosis. There was  moderate regurgitation. Tricuspid valve: There was mild regurgitation.  Tricuspid regurgitation  peak velocity: 3 m/sec. Pulmonary artery systolic pressure: 39 mmHg. SUMMARY:echo:8/2015  Left ventricle: Systolic function was mildly reduced. Ejection fraction  was estimated to be 47 %. There was mild diffuse hypokinesis. Doppler  parameters were consistent with abnormal left ventricular relaxation  (grade 1 diastolic dysfunction). Left atrium: The atrium was moderately dilated. Mitral valve: There was mild annular calcification. There was mild diffuse  thickening of the anterior and posterior leaflets. There was moderate  regurgitation with multiple jets with a posteriorly directed flow. The  volume of mitral regurgitation by proximal isovelocity surface area was 18  ml. Aortic valve: The valve was trileaflet. Leaflets exhibited mildly  increased thickness, normal cuspal separation, and sclerosis. There was  moderate regurgitation. Tricuspid valve: There was mild regurgitation. Tricuspid regurgitation  peak velocity: 3.3 m/sec. Pulmonary artery systolic pressure: 46 mmHg. SUMMARY:yan:10/2015  Left ventricle: Systolic function was normal. Ejection fraction was  estimated to be 55 %. Mitral valve: There was moderate regurgitation. The effective orifice of  mitral regurgitation by proximal isovelocity surface area was 0.29 cm   squared. The volume of mitral regurgitation by proximal isovelocity surface area  was 56 ml. Aortic valve: There was mild to moderate regurgitation. FINDINGS:cath:11/2015  1. Left main is normal.  2. Left anterior descending artery is normal.  3. Diagonal 1 and diagonal 2 arteries appeared to be normal.  4. Circumflex artery is normal.  5. Right coronary artery is dominant and appears to be normal.  6. Left ventricular end-diastolic pressure was 14 mmHg. 7. Left ventricular angiogram was not performed due to moderate to severe  chronic kidney disease. 8. Pulmonary artery pressure was 38/15 mmHg. Right ventricular pressure was  42/ 4 mmHg.   9. Right atrial pressure was 5 mmHg. 10. Pulmonary capillary wedge pressure was ranging between 14-16 mmHg. 11. Pulmonary artery saturation was 68.80% on room air. Radial artery  saturation was 88.4% on room air. 12. Cardiac output by thermodilution technique was 5.17 liters per minute. Cardiac index of 3.22 liters per minute per meter squared. Heart rate was  92 beats per minute. CONCLUSION: Mildly elevated pulmonary artery pressures. Normal coronaries. The patient is to be on intense medical management and risk factor  modification. SUMMARY:3/2017  Left ventricle: Systolic function was moderately reduced. Ejection  fraction was estimated in the range of 35 % to 40 %. There was diffuse  hypokinesis. Features were consistent with a pseudonormal left ventricular  filling pattern, with concomitant abnormal relaxation and increased  filling pressure (grade 2 diastolic dysfunction). Right ventricle: The size was normal.    Left atrium: The atrium was mildly dilated. LA volume index was 37.51  ml/mï¾². Right atrium: Size was normal.    Mitral valve: There was mild annular calcification. There was moderate to  severe regurgitation. Aortic valve: The valve was trileaflet. Leaflets exhibited mildly  increased thickness and normal cuspal separation. There was moderate  regurgitation. [id number]  · PVL's positive for acute occlusive left popliteal dvt  · No need for AC at this point. Will need to repeat PVL weekly for 2 weeks and evaluate for propagation proximally. If no propagation, then no AC needed and continue to monitor; if dissolves, no intervention needed; if propagates, will strongly consider AC vs IVC filter with hx of falls/imbalance. It would depend on PT/OT improvements at that point. · Hold on full tx AC at this point    Assessment         ICD-10-CM ICD-9-CM    1.  Chronic combined systolic and diastolic CHF, NYHA class 3 (HCC) G12.97 111.54 METABOLIC PANEL, BASIC     428.0     sob on exertion  nyha class3   2. Cardiomyopathy, unspecified type I42.9 425.4     recently worse lvef   3. Essential hypertension, benign I10 401.1     controlled   4. Mitral valve insufficiency and aortic valve insufficiency I08.0 396.3     mod to severe mr  mod ai     5. Acute deep vein thrombosis (DVT) of popliteal vein of left lower extremity (HCC) I82.432 453.41        There are no discontinued medications. Orders Placed This Encounter    METABOLIC PANEL, BASIC     Standing Status:   Future     Standing Expiration Date:   5/6/2017    lisinopril (PRINIVIL, ZESTRIL) 10 mg tablet     Sig: Take 1 Tab by mouth daily. Dispense:  30 Tab     Refill:  6       Follow-up Disposition:  Return in about 6 weeks (around 5/18/2017).

## 2017-04-19 NOTE — TELEPHONE ENCOUNTER
Patient called to discuss lab results. She stated that she is already taking potassium only once a day. Please advise.

## 2017-04-19 NOTE — TELEPHONE ENCOUNTER
----- Message from Moni Her MD sent at 4/19/2017  7:38 AM EDT -----  Increase creatinine  Relatively stable  Potassium normal  Reduce potassium to once a day

## 2017-08-01 PROBLEM — E87.0 DEHYDRATION WITH HYPERNATREMIA: Status: ACTIVE | Noted: 2017-01-01

## 2017-08-01 PROBLEM — E86.0 DEHYDRATION WITH HYPERNATREMIA: Status: ACTIVE | Noted: 2017-01-01

## 2017-08-14 NOTE — TELEPHONE ENCOUNTER
Patient is currently in the hospital and is scheduled for follow up on 8/16/17 with Dr. Jaida Linares.